# Patient Record
Sex: MALE | URBAN - METROPOLITAN AREA
[De-identification: names, ages, dates, MRNs, and addresses within clinical notes are randomized per-mention and may not be internally consistent; named-entity substitution may affect disease eponyms.]

---

## 2017-07-28 ENCOUNTER — TRANSFERRED RECORDS (OUTPATIENT)
Dept: HEALTH INFORMATION MANAGEMENT | Facility: CLINIC | Age: 57
End: 2017-07-28

## 2017-08-02 ENCOUNTER — COMMUNICATION - HEALTHEAST (OUTPATIENT)
Dept: NEUROLOGY | Facility: CLINIC | Age: 57
End: 2017-08-02

## 2017-08-14 ENCOUNTER — RECORDS - HEALTHEAST (OUTPATIENT)
Dept: LAB | Facility: CLINIC | Age: 57
End: 2017-08-14

## 2017-08-14 LAB — ALT SERPL W P-5'-P-CCNC: 17 U/L (ref 0–45)

## 2017-09-14 ENCOUNTER — RECORDS - HEALTHEAST (OUTPATIENT)
Dept: ADMINISTRATIVE | Facility: OTHER | Age: 57
End: 2017-09-14

## 2017-09-14 ENCOUNTER — HOSPITAL ENCOUNTER (OUTPATIENT)
Dept: NEUROLOGY | Facility: CLINIC | Age: 57
Setting detail: THERAPIES SERIES
Discharge: STILL ADMITTED OR ADMINISTRATIVE | End: 2017-09-14
Attending: SOCIAL WORKER
Payer: COMMERCIAL

## 2017-09-14 ENCOUNTER — HOSPITAL ENCOUNTER (OUTPATIENT)
Dept: NEUROLOGY | Facility: CLINIC | Age: 57
Setting detail: THERAPIES SERIES
Discharge: STILL ADMITTED OR ADMINISTRATIVE | End: 2017-09-14
Attending: PSYCHIATRY & NEUROLOGY

## 2017-09-14 DIAGNOSIS — F06.30 MOOD DISORDER IN CONDITIONS CLASSIFIED ELSEWHERE: ICD-10-CM

## 2017-12-05 ENCOUNTER — COMMUNICATION - HEALTHEAST (OUTPATIENT)
Dept: NEUROLOGY | Facility: CLINIC | Age: 57
End: 2017-12-05

## 2017-12-05 ENCOUNTER — HOSPITAL ENCOUNTER (OUTPATIENT)
Dept: NEUROLOGY | Facility: CLINIC | Age: 57
Setting detail: THERAPIES SERIES
Discharge: STILL ADMITTED OR ADMINISTRATIVE | End: 2017-12-05
Attending: PSYCHIATRY & NEUROLOGY

## 2017-12-05 DIAGNOSIS — F06.30 MOOD DISORDER IN CONDITIONS CLASSIFIED ELSEWHERE: ICD-10-CM

## 2017-12-08 ENCOUNTER — AMBULATORY - HEALTHEAST (OUTPATIENT)
Dept: NEUROLOGY | Facility: CLINIC | Age: 57
End: 2017-12-08

## 2018-03-13 ENCOUNTER — HOSPITAL ENCOUNTER (OUTPATIENT)
Dept: NEUROLOGY | Facility: CLINIC | Age: 58
Setting detail: THERAPIES SERIES
Discharge: STILL ADMITTED OR ADMINISTRATIVE | End: 2018-03-13
Attending: PSYCHIATRY & NEUROLOGY

## 2018-03-13 DIAGNOSIS — F06.30 MOOD DISORDER IN CONDITIONS CLASSIFIED ELSEWHERE: ICD-10-CM

## 2018-08-10 ENCOUNTER — TRANSFERRED RECORDS (OUTPATIENT)
Dept: HEALTH INFORMATION MANAGEMENT | Facility: CLINIC | Age: 58
End: 2018-08-10

## 2018-12-12 ENCOUNTER — TELEPHONE (OUTPATIENT)
Dept: FAMILY MEDICINE | Facility: CLINIC | Age: 58
End: 2018-12-12

## 2018-12-18 ENCOUNTER — OFFICE VISIT (OUTPATIENT)
Dept: FAMILY MEDICINE | Facility: CLINIC | Age: 58
End: 2018-12-18
Payer: COMMERCIAL

## 2018-12-18 VITALS
DIASTOLIC BLOOD PRESSURE: 82 MMHG | HEIGHT: 77 IN | BODY MASS INDEX: 32.85 KG/M2 | HEART RATE: 76 BPM | OXYGEN SATURATION: 96 % | SYSTOLIC BLOOD PRESSURE: 130 MMHG | WEIGHT: 278.2 LBS | TEMPERATURE: 98.5 F

## 2018-12-18 DIAGNOSIS — I10 ESSENTIAL HYPERTENSION: ICD-10-CM

## 2018-12-18 DIAGNOSIS — N52.1 ERECTILE DYSFUNCTION DUE TO DISEASES CLASSIFIED ELSEWHERE: ICD-10-CM

## 2018-12-18 DIAGNOSIS — R25.9 MIXED ACTION AND RESTING TREMOR: ICD-10-CM

## 2018-12-18 DIAGNOSIS — R45.1 AGITATION: ICD-10-CM

## 2018-12-18 DIAGNOSIS — S06.9X9S TRAUMATIC BRAIN INJURY WITH LOSS OF CONSCIOUSNESS, SEQUELA (H): Primary | ICD-10-CM

## 2018-12-18 PROCEDURE — 80053 COMPREHEN METABOLIC PANEL: CPT | Performed by: FAMILY MEDICINE

## 2018-12-18 PROCEDURE — 36415 COLL VENOUS BLD VENIPUNCTURE: CPT | Performed by: FAMILY MEDICINE

## 2018-12-18 PROCEDURE — 99214 OFFICE O/P EST MOD 30 MIN: CPT | Performed by: FAMILY MEDICINE

## 2018-12-18 RX ORDER — SILDENAFIL 50 MG/1
TABLET, FILM COATED ORAL
Qty: 8 TABLET | Refills: 0 | Status: SHIPPED | OUTPATIENT
Start: 2018-12-18

## 2018-12-18 RX ORDER — SILDENAFIL 50 MG/1
50 TABLET, FILM COATED ORAL DAILY PRN
Status: CANCELLED | OUTPATIENT
Start: 2018-12-18 | End: 2019-12-18

## 2018-12-18 ASSESSMENT — MIFFLIN-ST. JEOR: SCORE: 2181.35

## 2018-12-18 NOTE — PROGRESS NOTES
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  Alcohol use disorder   Post traumatic seizures  Hypertension  Hyperlipidemia  History of SVT-S/P ablation- since no recurrence  No coronary artery disease  Obesity & probable sleep apnea   requiring support for most of his   Sleep study scheduled at [facility]    Six Item Cognitive Impairment Test   (6CIT):      What year is it?                               Correct - 0 points    What month is it?                               Correct - 0 points      Give the patient an address to remember with five components:   [place] ( first and last name - 2 components)   [address]  (number and name of street - 2 components)   [place] ( city - 1 component)      About what time is it (within the hour)? Correct - 0 points    Count backwards from 20 to 1:   Correct - 0 points    Say the months of the year in reverse: One error - 2 points    Repeat the address phrase:   Correct - 0 points    Total 6CIT Score:      26/28    Interpretation: The 6CIT uses an inverse score and questions are weighted to produce a total out of 28. Scores of 0-7 are considered normal and 8 or more significant.    Advantages The test has high sensitivity without compromising specificity even in mild dementia. It is easy to translate linguistically and culturally.  Disadvantages The main disadvantage is in the scoring and weighting of the test, which is initially confusing, however computer models have simplified this greatly.    Probability Statistics: At the 7/8 cut off: Overall figures sensitivity 90% specificity 100%, in mild dementia sensitivity = 78% ,  "specificity = 100%    Copyright 2000 [facility], [facility]. Courtesy of Dr. Jah Orourke    PROBLEMS TO ADD ON...    Problem list and histories reviewed & adjusted, as indicated.  Additional history: as documented    Patient Active Problem List   Diagnosis     Moderate major depression (H)     Benign essential hypertension     Mixed hyperlipidemia     Elevated fasting blood sugar     Obesity     Major depressive disorder, recurrent episode, moderate (H)     Traumatic intracerebral hematoma with loss of consciousness (H)     Acute intracranial hemorrhage (H)     TBI (traumatic brain injury) (H)     Acute metabolic encephalopathy     Alcohol withdrawal (H)     Alcohol dependence (H)     Chronic low back pain without sciatica     Pressure ulcer of calf, stage II     Constipation, unspecified constipation type     Essential hypertension     Acute hyponatremia     Acute kidney injury (H)     Rhabdomyolysis     Agitation     Encephalopathy acute     Past Surgical History:   Procedure Laterality Date     LASIK BILATERAL  1998     left knee medial menisus  1990     tonsilecomy         Social History     Tobacco Use     Smoking status: Never Smoker     Smokeless tobacco: Former User     Types: Chew     Tobacco comment: chew tobacco, stopped jan 02,2013   Substance Use Topics     Alcohol use: Yes     Alcohol/week: 0.0 oz     Comment: sober since 10/2/12     Family History   Problem Relation Age of Onset     C.A.D. Father 49     Hypertension Mother         84           Reviewed and updated as needed this visit by clinical staff  Allergies  Meds       Reviewed and updated as needed this visit by Provider         ROS:  Constitutional, HEENT, cardiovascular, pulmonary, gi and gu systems are negative, except as otherwise noted.    OBJECTIVE:     /82   Pulse 76   Temp 98.5  F (36.9  C) (Oral)   Ht 1.943 m (6' 4.5\")   Wt 126.2 kg (278 lb 3.2 oz)   SpO2 96%   BMI 33.42 kg/m    Body mass index is " 33.42 kg/m .  GENERAL: healthy, alert and no distress  NECK: no adenopathy, no asymmetry, masses, or scars and thyroid normal to palpation  RESP: lungs clear to auscultation - no rales, rhonchi or wheezes  CV: regular rate and rhythm, normal S1 S2, no S3 or S4, no murmur, click or rub, no peripheral edema and peripheral pulses strong  ABDOMEN: soft, nontender, no hepatosplenomegaly, no masses and bowel sounds normal  MS: no gross musculoskeletal defects noted, no edema    Diagnostic Test Results:  none     ASSESSMENT/PLAN:         Radha Castro MD  [facility]

## 2018-12-18 NOTE — NURSING NOTE
"Chief Complaint   Patient presents with     Head Injury     patient had a traumatic head injury in 07/2016 and is wanting to follow up     /82   Pulse 76   Temp 98.5  F (36.9  C) (Oral)   Ht 1.943 m (6' 4.5\")   Wt 126.2 kg (278 lb 3.2 oz)   SpO2 96%   BMI 33.42 kg/m   Estimated body mass index is 33.42 kg/m  as calculated from the following:    Height as of this encounter: 1.943 m (6' 4.5\").    Weight as of this encounter: 126.2 kg (278 lb 3.2 oz).  Medication Reconciliation: complete      Health Maintenance that is potentially due pending provider review:  PHQ9 and GAD7    Possibly completing today per provider review.    BETH Weldon  "

## 2018-12-18 NOTE — LETTER
December 20, 2018      [place] [address]        Dear ,    We are writing to inform you of your test results.    Happy to inform you about normal blood test-   Please find reports  attached to the letter   -Liver and gallbladder tests are normal (ALT,AST, Alk phos, bilirubin),   kidney function is normal (Cr, GFR),   sodium is slightly low, potassium is normal, calcium is normal, glucose is normal..     Resulted Orders   Comprehensive metabolic panel (BMP + Alb, Alk Phos, ALT, AST, Total. Bili, TP)   Result Value Ref Range    Sodium 131 (L) 133 - 144 mmol/L    Potassium 4.5 3.4 - 5.3 mmol/L    Chloride 100 94 - 109 mmol/L    Carbon Dioxide 25 20 - 32 mmol/L    Anion Gap 6 3 - 14 mmol/L    Glucose 90 70 - 99 mg/dL    Urea Nitrogen 11 7 - 30 mg/dL    Creatinine 0.74 0.66 - 1.25 mg/dL    GFR Estimate >90 >60 mL/min/1.7m2      Comment:      Non  GFR Calc  Starting 12/18/2018, serum creatinine based estimated GFR (eGFR) will be   calculated using the Chronic Kidney Disease Epidemiology Collaboration   (CKD-EPI) equation.      GFR Estimate If Black >90 >60 mL/min/1.7m2      Comment:       GFR Calc  Starting 12/18/2018, serum creatinine based estimated GFR (eGFR) will be   calculated using the Chronic Kidney Disease Epidemiology Collaboration   (CKD-EPI) equation.      Calcium 9.3 8.5 - 10.1 mg/dL    Bilirubin Total 0.4 0.2 - 1.3 mg/dL    Albumin 4.1 3.4 - 5.0 g/dL    Protein Total 7.7 6.8 - 8.8 g/dL    Alkaline Phosphatase 48 40 - 150 U/L    ALT 19 0 - 70 U/L    AST 6 0 - 45 U/L       If you have any questions or concerns, please call the clinic at the number listed above.     Sincerely,    Radha Castro MD

## 2018-12-19 LAB
ALBUMIN SERPL-MCNC: 4.1 G/DL (ref 3.4–5)
ALP SERPL-CCNC: 48 U/L (ref 40–150)
ALT SERPL W P-5'-P-CCNC: 19 U/L (ref 0–70)
ANION GAP SERPL CALCULATED.3IONS-SCNC: 6 MMOL/L (ref 3–14)
AST SERPL W P-5'-P-CCNC: 6 U/L (ref 0–45)
BILIRUB SERPL-MCNC: 0.4 MG/DL (ref 0.2–1.3)
BUN SERPL-MCNC: 11 MG/DL (ref 7–30)
CALCIUM SERPL-MCNC: 9.3 MG/DL (ref 8.5–10.1)
CHLORIDE SERPL-SCNC: 100 MMOL/L (ref 94–109)
CO2 SERPL-SCNC: 25 MMOL/L (ref 20–32)
CREAT SERPL-MCNC: 0.74 MG/DL (ref 0.66–1.25)
GFR SERPL CREATININE-BSD FRML MDRD: >90 ML/MIN/1.7M2
GLUCOSE SERPL-MCNC: 90 MG/DL (ref 70–99)
POTASSIUM SERPL-SCNC: 4.5 MMOL/L (ref 3.4–5.3)
PROT SERPL-MCNC: 7.7 G/DL (ref 6.8–8.8)
SODIUM SERPL-SCNC: 131 MMOL/L (ref 133–144)

## 2018-12-20 NOTE — RESULT ENCOUNTER NOTE
Dear team,    Send lab & letter    Dear Ad  Happy to inform you about normal blood test-  Please find reports  attached to the letter  -Liver and gallbladder tests are normal (ALT,AST, Alk phos, bilirubin),   kidney function is normal (Cr, GFR),   sodium is slightly low, potassium is normal, calcium is normal, glucose is normal..  Please keep us posted with questions or concerns .      Best Regards,    Radha Castro MD  [facility]  [phone]

## 2018-12-28 PROBLEM — R25.9 MIXED ACTION AND RESTING TREMOR: Status: ACTIVE | Noted: 2018-12-28

## 2018-12-28 PROBLEM — N52.1 ERECTILE DYSFUNCTION DUE TO DISEASES CLASSIFIED ELSEWHERE: Status: ACTIVE | Noted: 2018-12-28

## 2018-12-28 NOTE — PROGRESS NOTES
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  Alcohol use disorder - denies any current concern  Post traumatic seizures- none since initial hospitalization in 2016  Hypertension- no concerns with medications   Hyperlipidemia  History of SVT-S/P ablation- since no recurrence  No coronary artery disease  Obesity & probable sleep apnea   Sleep study scheduled at [facility]    Six Item Cognitive Impairment Test   (6CIT):      What year is it?                               Correct - 0 points    What month is it?                               Correct - 0 points      Give the patient an address to remember with five components:   Juan Heard ( first and last name - 2 components)   [address]  (number and name of street - 2 components)   [place] ( city - 1 component)      About what time is it (within the hour)? Correct - 0 points    Count backwards from 20 to 1:   Correct - 0 points    Say the months of the year in reverse: One error - 2 points    Repeat the address phrase:   Correct - 0 points    Total 6CIT Score:      26/28    Interpretation: The 6CIT uses an inverse score and questions are weighted to produce a total out of 28. Scores of 0-7 are considered normal and 8 or more significant.    Advantages The test has high sensitivity without compromising specificity even in mild dementia. It is easy to translate linguistically and culturally.  Disadvantages The main disadvantage is in the scoring and weighting of the test, which is initially confusing, however computer models have simplified this greatly.    Probability Statistics: At the 7/8 cut off: Overall  "figures sensitivity 90% specificity 100%, in mild dementia sensitivity = 78% , specificity = 100%    Copyright 2000 The [facility], [facility], . Courtesy of Dr. Jah Orourke    PROBLEMS TO ADD ON...    Problem list and histories reviewed & adjusted, as indicated.  Additional history: as documented    Patient Active Problem List   Diagnosis     Moderate major depression (H)     Benign essential hypertension     Mixed hyperlipidemia     Elevated fasting blood sugar     Obesity     Major depressive disorder, recurrent episode, moderate (H)     Traumatic intracerebral hematoma with loss of consciousness (H)     Acute intracranial hemorrhage (H)     TBI (traumatic brain injury) (H)     Acute metabolic encephalopathy     Alcohol withdrawal (H)     Alcohol dependence (H)     Chronic low back pain without sciatica     Pressure ulcer of calf, stage II     Constipation, unspecified constipation type     Essential hypertension     Acute hyponatremia     Acute kidney injury (H)     Rhabdomyolysis     Agitation     Encephalopathy acute     Past Surgical History:   Procedure Laterality Date     LASIK BILATERAL  1998     left knee medial menisus  1990     tonsilecomy         Social History     Tobacco Use     Smoking status: Never Smoker     Smokeless tobacco: Former User     Types: Chew     Tobacco comment: chew tobacco, stopped jan 02,2013   Substance Use Topics     Alcohol use: Yes     Alcohol/week: 0.0 oz     Comment: sober since 10/2/12     Family History   Problem Relation Age of Onset     C.A.D. Father 49     Hypertension Mother         84           Reviewed and updated as needed this visit by clinical staff  Allergies  Meds       Reviewed and updated as needed this visit by Provider         ROS:  Constitutional, HEENT, cardiovascular, pulmonary, gi and gu systems are negative, except as otherwise noted.    OBJECTIVE:     /82   Pulse 76   Temp 98.5  F (36.9  C) (Oral)   Ht 1.943 m (6' 4.5\")   Wt " 126.2 kg (278 lb 3.2 oz)   SpO2 96%   BMI 33.42 kg/m    Body mass index is 33.42 kg/m .  GENERAL: healthy, alert and no distress  NECK: no adenopathy, no asymmetry, masses, or scars and thyroid normal to palpation  RESP: lungs clear to auscultation - no rales, rhonchi or wheezes  CV: regular rate and rhythm, normal S1 S2, no S3 or S4, no murmur, click or rub, no peripheral edema and peripheral pulses strong  ABDOMEN: soft, nontender, no hepatosplenomegaly, no masses and bowel sounds normal  MS: no gross musculoskeletal defects noted, no edema    Diagnostic Test Results:  none     ASSESSMENT/PLAN:   (S06.9X9S) Traumatic brain injury with loss of consciousness, sequela (H)  (primary encounter diagnosis)  Plan: NEUROLOGY ADULT REFERRAL  58 male with TBI- required long hospitalization/rehab. Currently in care center.  History of seizure as well.  He would like to be cleared to start driving and also to be able to go to work.    I have advised to see neurologist for further evaluation and clearance      (I10) Essential hypertension  Plan: controlled on metoprolol 50 mg once daily . No medications changes        (R25.9) Mixed action and resting tremor  Plan: NEUROLOGY ADULT REFERRAL, Comprehensive         metabolic panel (BMP + Alb, Alk Phos, ALT, AST,        Total. Bili, TP)      (N52.1) Erectile dysfunction due to diseases classified elsewhere  Plan: sildenafil (VIAGRA) 50 MG tablet     Use only as needed    Caution with medications given    History of agitation and behavior problems  Plan: continue with current medications        Radha Castro MD  [facility]

## 2019-01-02 ENCOUNTER — TELEPHONE (OUTPATIENT)
Dept: FAMILY MEDICINE | Facility: CLINIC | Age: 59
End: 2019-01-02

## 2019-01-02 NOTE — TELEPHONE ENCOUNTER
Polly Sandoval  Reason for Call:  Other call back    Detailed comments: MIKE Watson would like call back from RN.  She had requested Ad's records be reviewed prior to his appt.  She wants to make sure we have all pertinent records from Dr. Matias, Dr. Jarvis and Dr. Sandoval from [employer].      Phone Number Patient can be reached at: Other phone number:  [phone]    Best Time: any    Can we leave a detailed message on this number? YES    Call taken on 1/2/2019 at 3:22 PM by Gayle Gil

## 2019-01-04 ENCOUNTER — MEDICAL CORRESPONDENCE (OUTPATIENT)
Dept: HEALTH INFORMATION MANAGEMENT | Facility: CLINIC | Age: 59
End: 2019-01-04

## 2021-06-13 NOTE — PROGRESS NOTES
Initial Outpatient Psychiatry Consult Note     9/14/2017    Ad Burrows, a 57 y.o. male, resents for his first ever [place] contact.  He was referred by Dr. Matias.  The patient suffered a traumatic brain injury and live 2016.  He apparently had another brain injury in a 6 month window of this 1.  It appears that there was a traumatic brain injury in October of that year requiring bur hole treatment.  Details are unclear..  This was described as a subdural hematoma when he fell because of orthostatic blood pressure.  He was found to be in SVT with a rate of 168 bpm at that time.  He was taken to an emergency room in [facility].  The patient presents with outside materials mostly from his cardiology appointments.  Appears the patient has been on Zyprexa and olanzapine in the past.  He apparently was diagnosed with major depressive disorder in 2005.  He is described as having a prior brain injury as well.  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  Review of the record shows that the patient has had significant agitation and physical aggression at the facility.  He also reportedly has had some hallucinations.  The patient had been somewhat sedate on Zyprexa in the past but attempts at reducing that medication led to an increase in aggressiveness by report.  Patient apparently has had some level of neuropsychological testing in the past which has shown mild deficits with language and visual spatial learning as well as memory.  He had moderate deficits in attention and executive functioning and severe deficits with memory retrieval.  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  He also was noted to have major depressive disorder, recurrent, mild.  I do not have any [place] [facility] old psychiatry records.  There are multiple records of past medical and therapy content attacks.    Patient presents today on  low-dose scheduled Haldol and scheduled Zyprexa.    HPI:  He presents with limited information and he is a vague historian.  He was referred by Dr. Matias.  Patient presents with his friend Geovanna.  The patient is currently residing at [facility].  He was under commitment status but that ran out last spring.  He is failed multiple placements in the past.  He is currently vague but reports his medications are not working very well.    The patient was extremely vague and perseverated on wanting to have more independence.  He brought up the fact that he has a big home in [place] he wants to get back to.  He also talked about wanting to see his mother again.  The patient apparently has a sister that lives in [place] who used to work as a physician who may have more information but the patient himself was a vague historian.  The patient's friend does not know much about his past history but she reports the patient's behavioral dyscontrol is gotten much better with the current medication regime.  She does report that he was off Zyprexa few weeks ago for 5 days due to insurance issue and he had no change in his presentation at that time.  She believes the patient is extremely anxious and is been that way even before the brain injury.  She states that the anxiety tends to drive some of his perseveration.  The patient was vague but told me he was sleeping well and eating well.  He denied change in cognition.  He seemed to have limited insight into his cognitive deficits.  He reports no desire to be dead or thoughts of suicide.  No psychosis.  He denies any side effects to the medication.        I did spend quite a bit of time talking with the patient as well as his friend about the patient's current presentation and brain injury in general including behavioral approaches trying to decrease external stimulation and need for multitasking.  We also talked about redirection techniques.  The patient and  his friend would like a medication to try and address anxiety.  Current Medications:  Medications were personally reviewed at this meeting.  Please see the chart for current medication list.         Past medical History:  Patientis allergic to penicillins and trazodone.   has no past medical history on file.   has no past surgical history on file.     Allergy to penicillin.  ?? reaction with Trazodone (per friend)  ???  Reaction with Remeron (per friend)    Distant history of tonsillectomy, history of SVT as described above with resultant hypotension.    History of subdural hematoma in July 2016 as a result of a fall.    History of tonsillectomy, history of knee surgery, history of GE reflux, history of low back pain      Past Psychiatric History:  Patient apparently was diagnosed with major depressive disorder in 2005.  He suffered 2 traumatic brain injuries as described above in 2016.  Patient was left with significant confusion and agitation and hallucinations with significant behavioral dyscontrol.  He apparently was psychiatrically hospitalized in November 2016 at the [facility].  He apparently was treated with as needed Zyprexa as well as Depakote.  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  He was discharged from there to a TCU in [facility].  Patient had neuropsychological testing in part in the past with results as described above.  At the time of the patient's presentation here he is on Zydis 20 mg at night and Haldol 0.5 mg twice a day.    The patient's friend who is with him today reports that she met the patient in a support group years prior to the traumatic brain injury.  At that time he apparently was on Seroquel and was struggling with symptoms of depression.  She however did not know much about his treatment.  He may have been followed by Dr. Wallace.  The patient is not able to provide much information about any prior  psychiatric diagnoses or treatments.  He is unsure whether he was ever on medications.      Substance Abuse History:   has no tobacco, alcohol, and drug history on file.  There is mention in the chart of a prior problem with alcohol abuse.  Specific details are a bit unclear.  Apparently that has not been recently active.  He has been diagnosed with Warneke's encephalopathy.      Family History:  family history is not on file.  Coronary artery disease in the patient's father.  Hypertension in the patient's mother.  Apparently the patient's mother struggles with anxiety.      Social History:  Social History     Social History Narrative     She is currently disabled. , No children. 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  Patient is currently a resident of a memory care facility in [facility]. Comitment ran out last spring. [place] worker will be leaving the patient's care in the near future due to the fact that his commitment is over.  As a niece that has been described as a point of contact but that is a bit unclear.             Review Of Systems:  Patient denies having any current pain.  No headache.  No chest pain.  No shortness of breath.  Please see admit ROS.       Mental Status Exam:  Appearance: Patient was casually dressed but slow.  A bit disorganized and somewhat vague.  No obvious pain or shortness of breath.  Behavior: Cooperative.  A bit perseverative.  Not agitated or restless.  Slow.  Speech: Simple but intact sentence structure.  Extremely vague.  Flat with occasional delays.  He seems to have organizational difficulties.  Mood/Affect: Flat and depressed.  Not anxious or labile.  Attention and concentration: Distractible with impaired concentration.  Needs structure and even then is quite vague.  Thought Content: No hallucinations or delusions  Suicidal/Homicidal Ideation: None   Thought Process :slow, concrete and  a bit disorganized.  Insight: Impaired  Judgement: Impaired  Memory: Impaired.  Significant deficits noted.  Gait: Slow.  No tremor.  Orientation: Limited.  Please see therapy notes.      Recent Labs:  No results found for this or any previous visit (from the past 24 hour(s)).      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  I will assess for the appropriateness of possible psychotropic medication trials/changes.  At this point I am going to start the patient on low-dose venlafaxine X are at 37.5 mg a day for 2 weeks then increase to 75 mg a day.  Risks and benefits were discussed.  We will continue with the Haldol and Zyprexa as ordered and I would like to try and simplify this in the future after I have additional information.  The patient apparently was off Zyprexa but only for 5 days without any decline in his functioning or apparent problems.    She will return in 3 months for med check.  They agree to call or return sooner with questions, concerns or problems.      The patient will seek out appropriate emergency services should that become necessary.  I will make myself available if any questions, concerns, or problems arise.       Thank you for allowing me to participate in the care of this patient.         José Miguel Garcia MD

## 2021-06-13 NOTE — PROGRESS NOTES
.OUT PATIENT CLINICAL SOCIAL WORK: PSYCHOSOCIAL ASSESSMENT      Duplicate note.    Damaris Morse [facility]  9/27/17

## 2021-06-13 NOTE — PROGRESS NOTES
.OUT PATIENT CLINICAL SOCIAL WORK: PSYCHOSOCIAL ASSESSMENT      Ad Burrows   1960 9/21/2017    Primary Language: English  Referral Source: Dr. Salvatore Ardon and Dr. Matias.    Person(s) Present at Visit: Patient was accompanied by friend, Geovanna Kinsey ([phone])  Goal(s) for Visit: First Consultation (Second Opinion)  Presenting Concerns:  Patient suffered two brain injuries in 2006.   Cognitive:   Behavioral:        Ad Burrows is a 57 y.o. male who was accompanied to this appointment by his friend Sabine Kinsey ([phone]). This is the first contact/visit at [facility]. Patient is a  and has no children. Patient is originally from [place] and still has family living there.  The Patient currently resides in a memory care facility in [place], [place]. The patient has a history of multiple placements. Patient shared that he did not care for any of his past placements. Patient's friend shared that Patient was removed from previous placements due to aggressive behaviors. Patient was placed at the current facility under a commitment. The commitment recently ended and per Patient's friend case management will be ending soon as well.     Patient shared that he owns a home in [place], which he would like to return to. The patient stated, he would eventually return [place].   Patient's friend shared that Patient has a history of prior chemical use. Per Dr. Garcia's consultation notes this Patient was previously diagnosed with Warneke's encephalopathy.    PRIMARY DECISION MAKER FOR HEALTHCARE  Patient  Copy of legal documents on chart? No    Name:    Relationship:   Home #:   Work/Mobile #:      Additional Information: Patient's friend explained that the Patient currently as a Health Directive unfiled. However, the Patient stated that  health agent  would be changing within the week. At this time the Patient was unsure of who his new health agent would be.      PATIENT  REPRESENTATIVE  Unknown    Additional Contacts:     Primary Decision Maker for Healthcare provides verbal authorization for this clinic to have care coordination conversations with the above contacts as needed: Yes    HEALTHCARE DIRECTIVE/LEGAL ISSUES  Pt has healthcare directive: Yes     Name:      Contact information:      If yes, copy of documents on chart? No       Additional Information: Per Patient, a niece is patient's health care agent but this will be changing in the near future.    FINANCIAL INFORMATION  Primary Funding Source: [facility] Secondary Funding Source:      Additional Financial Information:    Financial POA: Yes - who: Unknown    SSI / SSDI: No     Social Security: No    : No    VA Benefits:      Branch:                    How long:      LTC Insurance: no    Medical Assistance (MA) Status:     No    County of Residence: [facility].    Waiver Services: No  type:      OCCUPATION / EDUCATION:  Current Employment: Disabled/unable to work   Prior Occupation(s): Per Dr. 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    BELIEF SYSTEM: Unknown    MEDICAL:  Please see Dr. Garcia's  consultation from 10/14/17 for complete medical history.  Community MD/Clinic:  Salvatore Ardon MD / [facility]      Additional Information/Concerns:     CHEMICAL HEALTH:  Current Tobacco Use: None  Prior Tobacco Use: None       Current Alcohol/Drug Use: Unknown    Treatment: Unknown  Prior Alcohol/Drug Use: Yes      Treatment:Unknown  Additional Information/Concerns:  Yes Per Dr. Garcia's consultation report Patient had a history of alcohol abuse and prior diagnosis of Warneke's encephalopathy    PSYCHIATRIC / BEHAVIORAL:  Current Dx: Refer to medical chart   Current Treatment: Medication  Prior Dx: Refer to medical chart  Prior treatment: Refer to medical chart  Additional Information/Concerns:     HOME / LIVING  ENVIRONMENT:  [place]  Home Accessibility Concerns: None  Additional Information/Concerns:      COMMUNITY / SOCIAL SUPPORT:   Community services: Unknown    Additional Information/Concerns:   Strengths:   Limitations:      FAMILY SUPPORT:  Relationship Status:   Children: None  Additional Information/Concerns: Patient is orginially from [place] and would evently like to return.  Strengths:   Limitations:      DAILY ROUTINE:  Sleep-    Nutrition-   Activities-    Hobbies-   Additional Information/Concerns:    Strengths:    Limitations:      FUNCTIONAL STATUS:  Is patient driving?  Unknown                                 Additional Information:    Is patient independent with the following activities? Yes  Additional Information/Concerns: Patient currently lives in a memory care facility.      PRIOR COGNITIVE TESTING / IMAGING: Unknown    INTERVENTION(S):  Assessment  Additional Information: Per consultation notes of 9/14/17, Dr. Garcia is recommending he follow patient for psychiatric services.    PATIENT/FAMILY OBSERVATIONS:  Patient:    Family/Caregiver:     PLAN/FOLLOW-UP: See Dr. Garcia's consultations notes of 9/14/17.    Michela Torrez  Social Work Intern    09/22/17    I have read, discussed, and agree with the documentation as presented by Michela Torrez, Social Work Intern.    Damaris Morse, [facility]  [phone]

## 2021-06-14 NOTE — PROGRESS NOTES
Patient's impression of how medication is working? Good    Compliant with Medication? Yes    Side Effects: None    Current Symptoms : No    Any Concerns? No adverse affects to pt with medication. Don't notice significant changes.    Pain (0-10) No  Appetite change No  Sleep disturbance No  Change in energy No  Change in interest No  Change in concentration No  Psychosis/Hallucinations No  Negative thoughts No  Mood swings No  Alcohol use No  Drug use No  Anxiety Moderate  Sad/depressed mood low

## 2021-06-14 NOTE — PROGRESS NOTES
Outpatient Followup Psychiatric Evaluation      Pertinent History:   Ad Burrows, a 57 y.o. male, resents for his first ever [place] contact.  He was referred by Dr. Matias.  The patient suffered a traumatic brain injury and live 2016.  He apparently had another brain injury in a 6 month window of this 1.  It appears that there was a traumatic brain injury in October of that year requiring bur hole treatment.  Details are unclear..  This was described as a subdural hematoma when he fell because of orthostatic blood pressure.  He was found to be in SVT with a rate of 168 bpm at that time.  He was taken to an emergency room in [facility].  The patient presents with outside materials mostly from his cardiology appointments.  Appears the patient has been on Zyprexa and olanzapine in the past.  He apparently was diagnosed with major depressive disorder in 2005.  He is described as having a prior brain injury as well.  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  Review of the record shows that the patient has had significant agitation and physical aggression at the facility.  He also reportedly has had some hallucinations.  The patient had been somewhat sedate on Zyprexa in the past but attempts at reducing that medication led to an increase in aggressiveness by report.  Patient apparently has had some level of neuropsychological testing in the past which has shown mild deficits with language and visual spatial learning as well as memory.  He had moderate deficits in attention and executive functioning and severe deficits with memory retrieval.  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  He also was noted to have major depressive disorder, recurrent, mild.  I do not have any [facility] old psychiatry records.  There are multiple records of past medical and therapy content attacks.    Patient presents at  that time on low-dose scheduled Haldol and scheduled Zyprexa.  At the last visit we did start the patient on low-dose venlafaxine and increase to 75 mg a day.    Current Symptoms:   Patient presents today with his staff member.  The patient has been frustrated and continues to perseverate on people not giving him good news.  He wants to be able to drive and wants more independence.  He has moved to an assisted living environment which is much better than when he was at before but he does not seem to be appreciating this and continues to be frustrated and down.  He wants to be able to turn return to his home with independent living.  He wants to be able to drive.  The patient admits that he is frustrated but is extremely vague.  No suicidality.  There is been no change in cognition.  No hallucinations or delusions.  No obvious side effects to the medication.  No change in his physical status.    Current Medications: Please see chart. Medications personally reviewed.    Medication Compliance: yes    Side Effects to Medications:  No      Vitals:  Wt Readings from Last 3 Encounters:   No data found for Wt     Temp Readings from Last 3 Encounters:   No data found for Temp     BP Readings from Last 3 Encounters:   No data found for BP     Pulse Readings from Last 3 Encounters:   No data found for Pulse         Mental Status Exam:   Patient was slow but alert.  He maintained relatively good eye contact.  He did not appear to be uncomfortable or short of breath.  There was no evidence of any pain.  Speech was slow monotone simple and somewhat perseverative.  Not pressured or rambling.  Attention was fair.  Concentration is baseline impaired.  He somewhat concrete and flat and a bit perseverative.  Mood was depressed.  No anxiety.  No lability.  Thought content does not show any hallucinations or delusions.  No suicidal or homicidal ideation.  Thought formation does not show the patient to be loose.  Insight, judgment and  memory are all baseline impaired and unchanged.  Fund of knowledge is baseline impaired.    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      Plan:  Medication Adjustment:  I am going to increase the patient's Effexor XR to 150 mg a day.    Other:   Patient will follow-up here for medication check in 3-4 months.  The staff agreed to call before then with any questions or concerns.  They will follow up with the primary care doctor as described above.    Continue with the support of the clinic, reassurance, and redirection. Staff monitoring and ongoing assessments per team plan. Current psychotropic medication appears to represent the minimum effective dosage and appears medically necessary. We will continue to monitor and reassess. This team will utilize appropriate emergency services if necessary. I will make myself available if concerns or problems arise.    José Miguel Garcia MD

## 2021-06-16 NOTE — PROGRESS NOTES
Outpatient Followup Psychiatric Evaluation      Pertinent History:   Ad Burrows, a 57 y.o. male,  referred by Dr. Matias.  The patient suffered a traumatic brain injury and live 2016.  He apparently had another brain injury in a 6 month window of this 1.  It appears that there was a traumatic brain injury in October of that year requiring bur hole treatment.  Details are unclear..  This was described as a subdural hematoma when he fell because of orthostatic blood pressure.  He was found to be in SVT with a rate of 168 bpm at that time.  He was taken to an emergency room in [facility].  The patient presents with outside materials mostly from his cardiology appointments.  Appears the patient has been on Zyprexa and olanzapine in the past.  He apparently was diagnosed with major depressive disorder in 2005.  He is described as having a prior brain injury as well.  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  Review of the record shows that the patient has had significant agitation and physical aggression at the facility.  He also reportedly has had some hallucinations.  The patient had been somewhat sedate on Zyprexa in the past but attempts at reducing that medication led to an increase in aggressiveness by report.  Patient apparently has had some level of neuropsychological testing in the past which has shown mild deficits with language and visual spatial learning as well as memory.  He had moderate deficits in attention and executive functioning and severe deficits with memory retrieval.  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  He also was noted to have major depressive disorder, recurrent, mild.  There are multiple records of past medical and therapy content attacks.    Patient initially presented on low-dose scheduled Haldol and scheduled Zyprexa.  Prior to the last visit we did start the  patient on low-dose venlafaxine and increase to 75 mg a day.  At the last visit we increased his Effexor further.  He had just moved into an assisted living environment.    Current Symptoms:   Patient presents today with his power of .  She presents with a long list of questions.  She states that she has questions from the patient's sister who might talk to by phone in the past with similar questions.  The power of  is frustrated that the patient is not remembering to take his medications.  She wants them simplified.  I did point out that the medications I am prescribing for the patient's are once a day.  They could take the medications virtually any time once a day.  She was frustrated by some of the other medications.  The patient himself was an extremely poor historian and the power of  had a hard time describing whether things were better or not.  Apparently he is been a bit more sleepy.  She reports that the sister wants the patient on less medication.  They do want the patient to be able to live more independently and I spent quite a bit of time talking about the cognitive difficulties that may make that difficult.  She also wanted me to assess whether the patient could drive by himself and his level of independence.  I pointed out that in medication visit that is tough to do.  She reports that he is being followed by therapist in a speech department that apparently is making those determinations and says he cannot drive he cannot hunt any cannot live independently.  I pointed out that I had no information to suspect those assessments were incorrect.  The patient himself is such a poor historian that I suspect those are exactly right.  He apparently is receiving therapies through another system.  They may change providers.  The power of  seemed quite frustrated at this visit.  She did not bring any medication lists.  She wanted us to reassess the medication after reviewing he  did receive that information and I told her I would go through that but given the length of this appointment and the lack of information there was not much more I could do at this point.    The patient was extremely vague but denied that he was having any cognitive problems or needed much assistance.  He wanted to know if he could drive.  He apparently is sleeping a bit more.  It is unclear whether he is napping during the day.  He continues to have significant cognitive impairment and is disorganized with very poor short-term memory making independence quite difficult for him.  He does like where he is living better.  He denies having any hallucinations or delusions.  There is no suicidality.  I had a hard time getting much other information.    Current Medications: Please see chart. Medications personally reviewed.    Medication Compliance: yes    Side Effects to Medications:  No      Vitals:  Wt Readings from Last 3 Encounters:   No data found for Wt     Temp Readings from Last 3 Encounters:   No data found for Temp     BP Readings from Last 3 Encounters:   No data found for BP     Pulse Readings from Last 3 Encounters:   No data found for Pulse         Mental Status Exam:   She was comfortable and calm but slow and flat.  Extremely vague.  No pain or shortness of breath.  Speech was vague simple and he had limited ability to provide much history.  He was not obviously depressed or anxious it did appear flat.  He seemed distractible with poor attention and concentration.  Thought content does not show any hallucinations or delusions.  No bizarre ideations.  Thought formation shows him to be concrete simple and slow.  No racing thoughts.  Insight, judgment and memory are all impaired and unchanged, fund of knowledge is impaired.    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      Plan:  Medication Adjustment:  At this time and going to decrease the Zyprexa to 10 mg a day as per the sister's request.  Risks and benefits were discussed.    Other:   I suggested follow-up in 3 months.  The POA said she may be looking for another provider in the [facility] system.  I told him to call back here if they had any problems or questions.  The staff agreed to call before then with any questions or concerns.  They will follow up with the primary care doctor.    Continue with the support of the clinic, reassurance, and redirection. Staff monitoring and ongoing assessments per team plan. Current psychotropic medication appears to represent the minimum effective dosage and appears medically necessary. We will continue to monitor and reassess. This team will utilize appropriate emergency services if necessary. I will make myself available if concerns or problems arise.    José Miguel Garcia MD

## 2021-06-16 NOTE — PROGRESS NOTES
Patient's impression of how medication is working? Sleeping a lot, can he cut back on any meds    Compliant with Medication? yes    Side Effects: Other sleeing a lot, eating to fast    Current Symptoms : No    Any Concerns? Sleeping a lot, wants to be independent     Pain (0-10) No  Appetite change No  Sleep disturbance Yes  Change in energy Yes  Change in interest No  Change in concentration No  Psychosis/Hallucinations No  Negative thoughts No  Mood swings No  Alcohol use No  Drug use No  Anxiety minimal  Sad/depressed mood low

## 2021-07-03 NOTE — ADDENDUM NOTE
Addendum Note by Keysha Bernard CMA at 12/5/2017 12:33 PM     Author: Keysha Bernard CMA Service: -- Author Type: Certified Medical Assistant    Filed: 12/5/2017 12:33 PM Date of Service: 12/5/2017 12:33 PM Status: Signed    : Keysha Bernard CMA (Certified Medical Assistant)    Encounter addended by: Keysha Bernard CMA on: 12/5/2017 12:33 PM<BR>     Actions taken: Charge Capture section accepted

## 2021-07-03 NOTE — ADDENDUM NOTE
Addendum Note by Keysha Bernard CMA at 9/14/2017 11:44 AM     Author: Keysha Bernard CMA Service: -- Author Type: Certified Medical Assistant    Filed: 9/14/2017 11:44 AM Date of Service: 9/14/2017 11:44 AM Status: Signed    : Keysha Bernard CMA (Certified Medical Assistant)    Encounter addended by: Keysha Bernard CMA on: 9/14/2017 11:44 AM<BR>     Actions taken: Charge Capture section accepted

## 2021-07-03 NOTE — ADDENDUM NOTE
Addendum Note by Keysha Bernard CMA at 3/13/2018 11:59 PM     Author: Keysha Bernard CMA Service: -- Author Type: Certified Medical Assistant    Filed: 3/16/2018  8:54 AM Date of Service: 3/13/2018 11:59 PM Status: Signed    : Keysha Bernard CMA (Certified Medical Assistant)    Encounter addended by: Keysha Bernard CMA on: 3/16/2018  8:54 AM<BR>     Actions taken: Charge Capture section accepted